# Patient Record
Sex: MALE | Race: WHITE | NOT HISPANIC OR LATINO | URBAN - METROPOLITAN AREA
[De-identification: names, ages, dates, MRNs, and addresses within clinical notes are randomized per-mention and may not be internally consistent; named-entity substitution may affect disease eponyms.]

---

## 2024-06-05 ENCOUNTER — OFFICE VISIT (OUTPATIENT)
Dept: URGENT CARE | Facility: CLINIC | Age: 29
End: 2024-06-05
Payer: COMMERCIAL

## 2024-06-05 ENCOUNTER — APPOINTMENT (OUTPATIENT)
Dept: RADIOLOGY | Facility: CLINIC | Age: 29
End: 2024-06-05
Payer: COMMERCIAL

## 2024-06-05 VITALS
OXYGEN SATURATION: 98 % | HEIGHT: 73 IN | BODY MASS INDEX: 24.76 KG/M2 | WEIGHT: 186.8 LBS | SYSTOLIC BLOOD PRESSURE: 112 MMHG | RESPIRATION RATE: 14 BRPM | HEART RATE: 60 BPM | DIASTOLIC BLOOD PRESSURE: 68 MMHG | TEMPERATURE: 97.4 F

## 2024-06-05 DIAGNOSIS — M79.645 PAIN OF FINGER OF LEFT HAND: Primary | ICD-10-CM

## 2024-06-05 DIAGNOSIS — S63.633A SPRAIN OF INTERPHALANGEAL JOINT OF LEFT MIDDLE FINGER, INITIAL ENCOUNTER: ICD-10-CM

## 2024-06-05 DIAGNOSIS — M79.645 PAIN OF FINGER OF LEFT HAND: ICD-10-CM

## 2024-06-05 PROCEDURE — S9083 URGENT CARE CENTER GLOBAL: HCPCS | Performed by: NURSE PRACTITIONER

## 2024-06-05 PROCEDURE — G0382 LEV 3 HOSP TYPE B ED VISIT: HCPCS | Performed by: NURSE PRACTITIONER

## 2024-06-05 PROCEDURE — 73130 X-RAY EXAM OF HAND: CPT

## 2024-06-05 RX ORDER — VIT E ACET/GLY/DIMETH/WATER
LOTION (ML) TOPICAL
COMMUNITY

## 2024-06-05 NOTE — PROGRESS NOTES
"  [facility]        NAME: Reji Kessler is a 28 y.o. male  : 1995    MRN: [id number]  DATE: 2024  TIME: 11:28 AM    Assessment and Plan   Pain of finger of left hand [M79.645]  1. Pain of finger of left hand  XR hand 3+ vw left      2. Sprain of interphalangeal joint of left middle finger, initial encounter          Xray done in office - images reviewed by myself - no evidence of fracture noted however, we are at the 3 week brown post injury at this time.  Recommend voltaren gel for 2 weeks   F/u with pcp     Patient Instructions     Follow up with PCP in 3-5 days.  Proceed to  ER if symptoms worsen.    Chief Complaint     Chief Complaint   Patient presents with    Finger Pain     Left 2nd and 3rd finger pain starting 3 weeks ago after smashing fingers in between two bus batteries.          History of Present Illness   Reji Kessler presents to the clinic c/o    Finger Pain (Left 2nd and 3rd finger pain starting 3 weeks ago after smashing fingers in between two bus batteries. )  Pt states notices more pain with the 3rd digit with bending the finger   States fingers were more jammed and not smashed as much   Concerned as there is an area of redness at times and just wanted it checked.   Pain is a 2/10 at times but not always.        Review of Systems   Review of Systems   All other systems reviewed and are negative.        Current Medications     Long-Term Medications   Medication Sig Dispense Refill    cetaphil (CETAPHIL) lotion Apply topically         Current Allergies     Allergies as of 2024    (No Known Allergies)            The following portions of the patient's history were reviewed and updated as appropriate: allergies, current medications, past family history, past medical history, past social history, past surgical history and problem list.    Objective   /68   Pulse 60   Temp (!) 97.4 °F (36.3 °C)   Resp 14   Ht 6' 1\" (1.854 m)   Wt 84.7 kg (186 lb 12.8 oz)   SpO2 " 98%   BMI 24.65 kg/m²        Physical Exam     Physical Exam  Vitals and nursing note reviewed.   Constitutional:       Appearance: Normal appearance. He is well-developed.   HENT:      Head: Normocephalic and atraumatic.   Eyes:      General: Lids are normal.      Extraocular Movements: Extraocular movements intact.      Conjunctiva/sclera: Conjunctivae normal.      Pupils: Pupils are equal, round, and reactive to light.   Cardiovascular:      Rate and Rhythm: Normal rate and regular rhythm.      Heart sounds: Normal heart sounds, S1 normal and S2 normal.   Pulmonary:      Effort: Pulmonary effort is normal.      Breath sounds: Normal breath sounds.   Musculoskeletal:      Left hand: No swelling, deformity, lacerations, tenderness or bony tenderness. Normal range of motion. Normal strength. Normal sensation. There is no disruption of two-point discrimination. Normal capillary refill. Normal pulse.        Hands:    Skin:     General: Skin is warm and dry.   Neurological:      Mental Status: He is alert.   Psychiatric:         Speech: Speech normal.         Behavior: Behavior normal.         Thought Content: Thought content normal.         Judgment: Judgment normal.

## 2024-06-05 NOTE — PATIENT INSTRUCTIONS
Voltaren Gel topically  Finger Sprain   AMBULATORY CARE:   A finger sprain  happens when ligaments in your finger or thumb are stretched or torn. Ligaments are the tough tissues that connect bones. Ligaments allow your hands to grasp and pinch.  Common symptoms include the following:   Bruising or changes in skin color    Pain and stiffness    Swelling and tenderness    Seek care immediately if:   The skin on your injured finger looks bluish or pale (less color than normal).    You have new weakness or numbness in your finger or thumb.    You have a splint that you cannot adjust and it feels too tight.    Call your doctor if:   You have new or increased swelling or pain in your finger.    You have new or increased stiffness when you move your injured finger.    You have questions or concerns about your injury or treatment.    Treatment for a finger sprain  may include prescription pain medicine. Ask your healthcare provider how to take this medicine safely. Some prescription pain medicines contain acetaminophen. Do not take other medicines that contain acetaminophen without talking to your healthcare provider. Too much acetaminophen may cause liver damage. Prescription pain medicine may cause constipation. Ask your healthcare provider how to prevent or treat constipation.  Care for a finger sprain:   Rest your finger for at least 48 hours.  Do not do activities that cause pain. Return to normal activities as directed.    Apply ice on your finger to help decrease pain and swelling.  Use an ice pack, or put crushed ice in a plastic bag. Cover the bag with a towel before you place it on your finger. Apply ice every hour for 15 to 20 minutes at a time. You may need to apply ice at least 4 to 8 times each day. Continue for as many days as directed.    Elevate (raise) your finger above the level of your heart as often as you can.  This will help decrease swelling and pain. You can elevate your hand by resting it on a  pillow.         Use a splint or compression as directed.  Compression (tight hold) helps support your finger or thumb as it heals. Tape your injured finger to the finger beside it. Severe sprains may be treated with a splint. A splint prevents your finger from moving while it heals. Ask how long you must wear the splint or tape, and how to apply them.    Do exercises as directed.  You may be given gentle exercises to begin in a few days. Exercises can help decrease stiffness in your finger or thumb. Exercises also help decrease pain and swelling and improve the movement of your finger or thumb. Check with your healthcare provider before you return to your normal activities or sports.    Follow up with your doctor as directed:  Write down your questions so you remember to ask them during your visits.  © Copyright [employer] 2023 Information is for End User's use only and may not be sold, redistributed or otherwise used for commercial purposes.  The above information is an  only. It is not intended as medical advice for individual conditions or treatments. Talk to your doctor, nurse or pharmacist before following any medical regimen to see if it is safe and effective for you.